# Patient Record
Sex: FEMALE | Race: WHITE | NOT HISPANIC OR LATINO | Employment: OTHER | URBAN - METROPOLITAN AREA
[De-identification: names, ages, dates, MRNs, and addresses within clinical notes are randomized per-mention and may not be internally consistent; named-entity substitution may affect disease eponyms.]

---

## 2021-05-25 ENCOUNTER — RECORDS - HEALTHEAST (OUTPATIENT)
Dept: ADMINISTRATIVE | Facility: CLINIC | Age: 81
End: 2021-05-25

## 2021-05-26 ENCOUNTER — RECORDS - HEALTHEAST (OUTPATIENT)
Dept: ADMINISTRATIVE | Facility: CLINIC | Age: 81
End: 2021-05-26

## 2021-05-27 ENCOUNTER — RECORDS - HEALTHEAST (OUTPATIENT)
Dept: ADMINISTRATIVE | Facility: CLINIC | Age: 81
End: 2021-05-27

## 2021-05-30 ENCOUNTER — RECORDS - HEALTHEAST (OUTPATIENT)
Dept: ADMINISTRATIVE | Facility: CLINIC | Age: 81
End: 2021-05-30

## 2022-04-27 ENCOUNTER — HOSPITAL ENCOUNTER (OUTPATIENT)
Facility: HOSPITAL | Age: 82
Setting detail: OBSERVATION
Discharge: HOME | End: 2022-04-29
Attending: EMERGENCY MEDICINE | Admitting: HOSPITALIST
Payer: COMMERCIAL

## 2022-04-27 DIAGNOSIS — E87.5 HYPERKALEMIA: ICD-10-CM

## 2022-04-27 DIAGNOSIS — R42 VERTIGO: ICD-10-CM

## 2022-04-27 PROCEDURE — 99285 EMERGENCY DEPT VISIT HI MDM: CPT | Mod: 25

## 2022-04-27 PROCEDURE — C9803 HOPD COVID-19 SPEC COLLECT: HCPCS

## 2022-04-28 ENCOUNTER — APPOINTMENT (OUTPATIENT)
Dept: MRI IMAGING | Facility: HOSPITAL | Age: 82
End: 2022-04-28
Attending: EMERGENCY MEDICINE
Payer: COMMERCIAL

## 2022-04-28 ENCOUNTER — APPOINTMENT (OUTPATIENT)
Dept: MRI IMAGING | Facility: HOSPITAL | Age: 82
End: 2022-04-28
Attending: HOSPITALIST
Payer: COMMERCIAL

## 2022-04-28 ENCOUNTER — APPOINTMENT (OUTPATIENT)
Dept: CARDIOLOGY | Facility: HOSPITAL | Age: 82
End: 2022-04-28
Attending: HOSPITALIST
Payer: COMMERCIAL

## 2022-04-28 PROBLEM — R42 VERTIGO: Status: ACTIVE | Noted: 2022-04-28

## 2022-04-28 PROBLEM — E87.5 HYPERKALEMIA: Status: ACTIVE | Noted: 2022-04-28

## 2022-04-28 LAB
ANION GAP SERPL CALCULATED.3IONS-SCNC: 13 MMOL/L (ref 5–18)
ANION GAP SERPL CALCULATED.3IONS-SCNC: 9 MMOL/L (ref 5–18)
ATRIAL RATE - MUSE: 56 BPM
BUN SERPL-MCNC: 27 MG/DL (ref 8–28)
BUN SERPL-MCNC: 37 MG/DL (ref 8–28)
CALCIUM SERPL-MCNC: 8.9 MG/DL (ref 8.5–10.5)
CALCIUM SERPL-MCNC: 9.6 MG/DL (ref 8.5–10.5)
CHLORIDE BLD-SCNC: 102 MMOL/L (ref 98–107)
CHLORIDE BLD-SCNC: 106 MMOL/L (ref 98–107)
CO2 SERPL-SCNC: 22 MMOL/L (ref 22–31)
CO2 SERPL-SCNC: 25 MMOL/L (ref 22–31)
CREAT SERPL-MCNC: 1 MG/DL (ref 0.6–1.1)
CREAT SERPL-MCNC: 1.32 MG/DL (ref 0.6–1.1)
DIASTOLIC BLOOD PRESSURE - MUSE: NORMAL MMHG
ERYTHROCYTE [DISTWIDTH] IN BLOOD BY AUTOMATED COUNT: 13.3 % (ref 10–15)
GFR SERPL CREATININE-BSD FRML MDRD: 40 ML/MIN/1.73M2
GFR SERPL CREATININE-BSD FRML MDRD: 55 ML/MIN/1.73M2
GLUCOSE BLD-MCNC: 117 MG/DL (ref 70–125)
GLUCOSE BLD-MCNC: 119 MG/DL (ref 70–125)
HCT VFR BLD AUTO: 39.8 % (ref 35–47)
HGB BLD-MCNC: 12.9 G/DL (ref 11.7–15.7)
INTERPRETATION ECG - MUSE: NORMAL
LVEF ECHO: NORMAL
MAGNESIUM SERPL-MCNC: 2.3 MG/DL (ref 1.8–2.6)
MCH RBC QN AUTO: 30.7 PG (ref 26.5–33)
MCHC RBC AUTO-ENTMCNC: 32.4 G/DL (ref 31.5–36.5)
MCV RBC AUTO: 95 FL (ref 78–100)
P AXIS - MUSE: 66 DEGREES
PLATELET # BLD AUTO: 257 10E3/UL (ref 150–450)
POTASSIUM BLD-SCNC: 4.3 MMOL/L (ref 3.5–5)
POTASSIUM BLD-SCNC: 5.1 MMOL/L (ref 3.5–5)
PR INTERVAL - MUSE: 218 MS
QRS DURATION - MUSE: 86 MS
QT - MUSE: 442 MS
QTC - MUSE: 426 MS
R AXIS - MUSE: 9 DEGREES
RBC # BLD AUTO: 4.2 10E6/UL (ref 3.8–5.2)
SARS-COV-2 RNA RESP QL NAA+PROBE: NEGATIVE
SODIUM SERPL-SCNC: 137 MMOL/L (ref 136–145)
SODIUM SERPL-SCNC: 140 MMOL/L (ref 136–145)
SYSTOLIC BLOOD PRESSURE - MUSE: NORMAL MMHG
T AXIS - MUSE: 75 DEGREES
VENTRICULAR RATE- MUSE: 56 BPM
WBC # BLD AUTO: 7.5 10E3/UL (ref 4–11)

## 2022-04-28 PROCEDURE — 250N000013 HC RX MED GY IP 250 OP 250 PS 637: Performed by: HOSPITALIST

## 2022-04-28 PROCEDURE — 255N000002 HC RX 255 OP 636: Performed by: EMERGENCY MEDICINE

## 2022-04-28 PROCEDURE — 96372 THER/PROPH/DIAG INJ SC/IM: CPT | Mod: XS | Performed by: HOSPITALIST

## 2022-04-28 PROCEDURE — G0378 HOSPITAL OBSERVATION PER HR: HCPCS

## 2022-04-28 PROCEDURE — 99220 PR INITIAL OBSERVATION CARE,LEVEL III: CPT | Performed by: HOSPITALIST

## 2022-04-28 PROCEDURE — 83735 ASSAY OF MAGNESIUM: CPT | Performed by: EMERGENCY MEDICINE

## 2022-04-28 PROCEDURE — 93306 TTE W/DOPPLER COMPLETE: CPT

## 2022-04-28 PROCEDURE — 36415 COLL VENOUS BLD VENIPUNCTURE: CPT | Performed by: EMERGENCY MEDICINE

## 2022-04-28 PROCEDURE — 258N000003 HC RX IP 258 OP 636: Performed by: HOSPITALIST

## 2022-04-28 PROCEDURE — 250N000013 HC RX MED GY IP 250 OP 250 PS 637: Performed by: EMERGENCY MEDICINE

## 2022-04-28 PROCEDURE — 36415 COLL VENOUS BLD VENIPUNCTURE: CPT | Performed by: HOSPITALIST

## 2022-04-28 PROCEDURE — 85027 COMPLETE CBC AUTOMATED: CPT | Performed by: EMERGENCY MEDICINE

## 2022-04-28 PROCEDURE — 999N000111 HC STATISTIC OT IP EVAL DEFER

## 2022-04-28 PROCEDURE — 80048 BASIC METABOLIC PNL TOTAL CA: CPT | Performed by: HOSPITALIST

## 2022-04-28 PROCEDURE — 96372 THER/PROPH/DIAG INJ SC/IM: CPT | Performed by: HOSPITALIST

## 2022-04-28 PROCEDURE — 70546 MR ANGIOGRAPH HEAD W/O&W/DYE: CPT

## 2022-04-28 PROCEDURE — 70551 MRI BRAIN STEM W/O DYE: CPT

## 2022-04-28 PROCEDURE — 80048 BASIC METABOLIC PNL TOTAL CA: CPT | Performed by: EMERGENCY MEDICINE

## 2022-04-28 PROCEDURE — 93306 TTE W/DOPPLER COMPLETE: CPT | Mod: 26 | Performed by: INTERNAL MEDICINE

## 2022-04-28 PROCEDURE — 87635 SARS-COV-2 COVID-19 AMP PRB: CPT | Performed by: EMERGENCY MEDICINE

## 2022-04-28 PROCEDURE — 93005 ELECTROCARDIOGRAM TRACING: CPT | Performed by: EMERGENCY MEDICINE

## 2022-04-28 PROCEDURE — 250N000011 HC RX IP 250 OP 636: Performed by: HOSPITALIST

## 2022-04-28 PROCEDURE — A9585 GADOBUTROL INJECTION: HCPCS | Performed by: EMERGENCY MEDICINE

## 2022-04-28 RX ORDER — LOSARTAN POTASSIUM 50 MG/1
100 TABLET ORAL DAILY
Status: DISCONTINUED | OUTPATIENT
Start: 2022-04-28 | End: 2022-04-29 | Stop reason: HOSPADM

## 2022-04-28 RX ORDER — ACETAMINOPHEN 325 MG/1
650 TABLET ORAL EVERY 4 HOURS PRN
Status: DISCONTINUED | OUTPATIENT
Start: 2022-04-28 | End: 2022-04-29 | Stop reason: HOSPADM

## 2022-04-28 RX ORDER — HYDRALAZINE HYDROCHLORIDE 10 MG/1
10 TABLET, FILM COATED ORAL 3 TIMES DAILY
Status: DISCONTINUED | OUTPATIENT
Start: 2022-04-28 | End: 2022-04-29 | Stop reason: HOSPADM

## 2022-04-28 RX ORDER — ONDANSETRON 2 MG/ML
4 INJECTION INTRAMUSCULAR; INTRAVENOUS EVERY 6 HOURS PRN
Status: DISCONTINUED | OUTPATIENT
Start: 2022-04-28 | End: 2022-04-29 | Stop reason: HOSPADM

## 2022-04-28 RX ORDER — LOSARTAN POTASSIUM 100 MG/1
100 TABLET ORAL DAILY
COMMUNITY
Start: 2022-01-27

## 2022-04-28 RX ORDER — ROSUVASTATIN CALCIUM 10 MG/1
10 TABLET, COATED ORAL AT BEDTIME
Status: DISCONTINUED | OUTPATIENT
Start: 2022-04-28 | End: 2022-04-29 | Stop reason: HOSPADM

## 2022-04-28 RX ORDER — SPIRONOLACTONE 25 MG/1
25 TABLET ORAL EVERY MORNING
Status: DISCONTINUED | OUTPATIENT
Start: 2022-04-28 | End: 2022-04-29 | Stop reason: HOSPADM

## 2022-04-28 RX ORDER — ROPINIROLE 0.25 MG/1
0.5 TABLET, FILM COATED ORAL AT BEDTIME
Status: DISCONTINUED | OUTPATIENT
Start: 2022-04-28 | End: 2022-04-29 | Stop reason: HOSPADM

## 2022-04-28 RX ORDER — ACETAMINOPHEN 325 MG/1
650 TABLET ORAL EVERY 8 HOURS
Status: DISCONTINUED | OUTPATIENT
Start: 2022-04-28 | End: 2022-04-29 | Stop reason: HOSPADM

## 2022-04-28 RX ORDER — ACYCLOVIR 400 MG/1
400 TABLET ORAL 3 TIMES DAILY PRN
COMMUNITY
Start: 2022-01-04

## 2022-04-28 RX ORDER — SERTRALINE HYDROCHLORIDE 100 MG/1
100 TABLET, FILM COATED ORAL DAILY
Status: DISCONTINUED | OUTPATIENT
Start: 2022-04-28 | End: 2022-04-29 | Stop reason: HOSPADM

## 2022-04-28 RX ORDER — ONDANSETRON 4 MG/1
4 TABLET, ORALLY DISINTEGRATING ORAL EVERY 6 HOURS PRN
Status: DISCONTINUED | OUTPATIENT
Start: 2022-04-28 | End: 2022-04-29 | Stop reason: HOSPADM

## 2022-04-28 RX ORDER — HEPARIN SODIUM 5000 [USP'U]/.5ML
5000 INJECTION, SOLUTION INTRAVENOUS; SUBCUTANEOUS EVERY 12 HOURS
Status: DISCONTINUED | OUTPATIENT
Start: 2022-04-28 | End: 2022-04-29 | Stop reason: HOSPADM

## 2022-04-28 RX ORDER — MECLIZINE HYDROCHLORIDE 25 MG/1
25 TABLET ORAL 3 TIMES DAILY PRN
Status: DISCONTINUED | OUTPATIENT
Start: 2022-04-28 | End: 2022-04-29 | Stop reason: HOSPADM

## 2022-04-28 RX ORDER — MECLIZINE HCL 12.5 MG 12.5 MG/1
25 TABLET ORAL ONCE
Status: COMPLETED | OUTPATIENT
Start: 2022-04-28 | End: 2022-04-28

## 2022-04-28 RX ORDER — AMOXICILLIN 250 MG
2 CAPSULE ORAL 2 TIMES DAILY PRN
Status: DISCONTINUED | OUTPATIENT
Start: 2022-04-28 | End: 2022-04-29 | Stop reason: HOSPADM

## 2022-04-28 RX ORDER — ROPINIROLE 0.5 MG/1
0.5 TABLET, FILM COATED ORAL
COMMUNITY
Start: 2021-08-23

## 2022-04-28 RX ORDER — ROSUVASTATIN CALCIUM 10 MG/1
10 TABLET, COATED ORAL AT BEDTIME
COMMUNITY
Start: 2022-04-23

## 2022-04-28 RX ORDER — FUROSEMIDE 40 MG
40 TABLET ORAL EVERY MORNING
COMMUNITY
Start: 2022-04-02

## 2022-04-28 RX ORDER — AMOXICILLIN 250 MG
1 CAPSULE ORAL 2 TIMES DAILY PRN
Status: DISCONTINUED | OUTPATIENT
Start: 2022-04-28 | End: 2022-04-29 | Stop reason: HOSPADM

## 2022-04-28 RX ORDER — ROPINIROLE 0.25 MG/1
0.5 TABLET, FILM COATED ORAL
Status: DISCONTINUED | OUTPATIENT
Start: 2022-04-28 | End: 2022-04-29 | Stop reason: HOSPADM

## 2022-04-28 RX ORDER — SERTRALINE HYDROCHLORIDE 100 MG/1
100 TABLET, FILM COATED ORAL DAILY
COMMUNITY
Start: 2022-04-07

## 2022-04-28 RX ORDER — HYDRALAZINE HYDROCHLORIDE 10 MG/1
10 TABLET, FILM COATED ORAL 3 TIMES DAILY
COMMUNITY
Start: 2022-02-17

## 2022-04-28 RX ORDER — ROPINIROLE 0.5 MG/1
0.5 TABLET, FILM COATED ORAL AT BEDTIME
COMMUNITY
Start: 2022-04-17

## 2022-04-28 RX ORDER — SODIUM CHLORIDE 9 MG/ML
INJECTION, SOLUTION INTRAVENOUS CONTINUOUS
Status: ACTIVE | OUTPATIENT
Start: 2022-04-28 | End: 2022-04-28

## 2022-04-28 RX ORDER — FUROSEMIDE 40 MG
20 TABLET ORAL DAILY
COMMUNITY
Start: 2021-08-23

## 2022-04-28 RX ORDER — SPIRONOLACTONE 25 MG/1
25 TABLET ORAL EVERY MORNING
COMMUNITY
Start: 2022-04-07

## 2022-04-28 RX ORDER — GADOBUTROL 604.72 MG/ML
6 INJECTION INTRAVENOUS ONCE
Status: COMPLETED | OUTPATIENT
Start: 2022-04-28 | End: 2022-04-28

## 2022-04-28 RX ADMIN — HYDRALAZINE HYDROCHLORIDE 10 MG: 10 TABLET, FILM COATED ORAL at 21:20

## 2022-04-28 RX ADMIN — MECLIZINE 25 MG: 12.5 TABLET ORAL at 01:16

## 2022-04-28 RX ADMIN — SERTRALINE HYDROCHLORIDE 100 MG: 100 TABLET, FILM COATED ORAL at 08:40

## 2022-04-28 RX ADMIN — HYDRALAZINE HYDROCHLORIDE 10 MG: 10 TABLET, FILM COATED ORAL at 08:40

## 2022-04-28 RX ADMIN — HEPARIN SODIUM 5000 UNITS: 10000 INJECTION, SOLUTION INTRAVENOUS; SUBCUTANEOUS at 22:28

## 2022-04-28 RX ADMIN — ROPINIROLE HYDROCHLORIDE 0.5 MG: 0.25 TABLET, FILM COATED ORAL at 21:21

## 2022-04-28 RX ADMIN — HYDRALAZINE HYDROCHLORIDE 10 MG: 10 TABLET, FILM COATED ORAL at 15:04

## 2022-04-28 RX ADMIN — GADOBUTROL 6 ML: 604.72 INJECTION INTRAVENOUS at 06:07

## 2022-04-28 RX ADMIN — SPIRONOLACTONE 25 MG: 25 TABLET, FILM COATED ORAL at 08:40

## 2022-04-28 RX ADMIN — SODIUM CHLORIDE: 9 INJECTION, SOLUTION INTRAVENOUS at 06:44

## 2022-04-28 RX ADMIN — HEPARIN SODIUM 5000 UNITS: 10000 INJECTION, SOLUTION INTRAVENOUS; SUBCUTANEOUS at 11:34

## 2022-04-28 RX ADMIN — ROSUVASTATIN CALCIUM 10 MG: 10 TABLET, FILM COATED ORAL at 21:21

## 2022-04-28 RX ADMIN — LOSARTAN POTASSIUM 100 MG: 50 TABLET, FILM COATED ORAL at 08:40

## 2022-04-28 ASSESSMENT — ENCOUNTER SYMPTOMS
NAUSEA: 0
CHILLS: 0
LIGHT-HEADEDNESS: 1
DIARRHEA: 0
DIZZINESS: 1
PALPITATIONS: 0
FEVER: 0
COUGH: 0
VOMITING: 0
NUMBNESS: 0
WEAKNESS: 0

## 2022-04-28 NOTE — ED TRIAGE NOTES
Pt presents with intermittent vertigo for 2 days.  Today vertigo became worse with movement.  No nausea or vomiting.  Pt is unable to walk by self tonight due to vertigo.      Triage Assessment     Row Name 04/27/22 2130       Triage Assessment (Adult)    Airway WDL WDL       Respiratory WDL    Respiratory WDL WDL       Skin Circulation/Temperature WDL    Skin Circulation/Temperature WDL WDL       Cardiac WDL    Cardiac WDL WDL       Peripheral/Neurovascular WDL    Peripheral Neurovascular WDL WDL       Cognitive/Neuro/Behavioral WDL    Cognitive/Neuro/Behavioral WDL WDL

## 2022-04-28 NOTE — PLAN OF CARE
Problem: Plan of Care - These are the overarching goals to be used throughout the patient stay.    Goal: Optimal Comfort and Wellbeing  Outcome: Ongoing, Progressing   Goal Outcome Evaluation:  Patient is alert and oriented X4. Patient is up with stand by assist, denies any dizziness. IV fluids running. Patient ate 100% of lunch and tolerated well.

## 2022-04-28 NOTE — PHARMACY-ADMISSION MEDICATION HISTORY
Pharmacy Note - Admission Medication History    Pertinent Provider Information: N/A     ______________________________________________________________________    Prior To Admission (PTA) med list completed and updated in EMR.       PTA Med List   Medication Sig Last Dose     acyclovir (ZOVIRAX) 400 MG tablet Take 400 mg by mouth 3 times daily as needed (Herpetic humberto) For 10 days. Unknown at not currently taking     furosemide (LASIX) 40 MG tablet Take 40 mg by mouth every morning 4/27/2022 at AM     furosemide (LASIX) 40 MG tablet Take 20 mg by mouth daily Afternoon, half-tablet 4/27/2022 at 1400     hydrALAZINE (APRESOLINE) 10 MG tablet Take 10 mg by mouth 3 times daily 4/27/2022 at 1400 x2     losartan (COZAAR) 100 MG tablet Take 100 mg by mouth daily 4/27/2022 at AM     rOPINIRole (REQUIP) 0.5 MG tablet Take 0.5 mg by mouth At Bedtime May repeat x1. 4/26/2022 at PM     rOPINIRole (REQUIP) 0.5 MG tablet Take 0.5 mg by mouth nightly as needed Unknown at Unknown time     rosuvastatin (CRESTOR) 10 MG tablet Take 10 mg by mouth At Bedtime 4/26/2022 at PM     sertraline (ZOLOFT) 100 MG tablet Take 100 mg by mouth daily 4/27/2022 at AM     spironolactone (ALDACTONE) 25 MG tablet Take 25 mg by mouth every morning 4/27/2022 at AM       Information source(s): Patient and CareEverywhere/[facility]  Method of interview communication: in-person    Summary of Changes to PTA Med List  New: all listed  Discontinued: N/A  Changed: N/A    Patient was asked about OTC/herbal products specifically.  PTA med list reflects this.    In the past week, patient estimated taking medication this percent of the time:  greater than 90%.    Allergies were reviewed, assessed, and updated with the patient.      Patient does not use any multi-dose medications prior to admission.    The information provided in this note is only as accurate as the sources available at the time of the update(s).    Thank you for the opportunity to participate in  the care of this patient.    Edgardo Casey [facility]  4/28/2022 7:29 AM

## 2022-04-28 NOTE — PLAN OF CARE
PRIMARY DIAGNOSIS: GENERALIZED WEAKNESS    OUTPATIENT/OBSERVATION GOALS TO BE MET BEFORE DISCHARGE  1. Orthostatic performed: Yes, no drop noted in blood pressure                      2. Tolerating PO medications: Yes    3. Return to near baseline physical activity: Yes, ambulating well, steady on feet. Denies any dizziness.     4. Cleared for discharge by consultants (if involved): No    Discharge Planner Nurse   Safe discharge environment identified: Yes  Barriers to discharge:Yes, PT/OT to see, echocardiogram completed       Entered by: Kirsten Stanley RN 04/28/2022 2:52 PM     Please review provider order for any additional goals.   Nurse to notify provider when observation goals have been met and patient is ready for discharge.Goal Outcome Evaluation:

## 2022-04-28 NOTE — PLAN OF CARE
PRIMARY DIAGNOSIS: ACUTE PAIN  OUTPATIENT/OBSERVATION GOALS TO BE MET BEFORE DISCHARGE:  1. Pain Status: Pain free.    2. Return to near baseline physical activity: No    3. Cleared for discharge by consultants (if involved): No    Discharge Planner Nurse   Safe discharge environment identified: No  Barriers to discharge: Yes       Entered by: Marie Epstein RN 04/28/2022 6:25 PM     Please review provider order for any additional goals.   Nurse to notify provider when observation goals have been met and patient is ready for discharge.Goal Outcome Evaluation:

## 2022-04-28 NOTE — PLAN OF CARE
OT orders received and chart review complete. Goals of care discussed with patient and her daughter and no IP OT needs were identified. Pt would like to participate in IP PT and IP OT if her vertigo turns out to be related to vestibular issues. Will complete orders.

## 2022-04-28 NOTE — ED PROVIDER NOTES
EMERGENCY DEPARTMENT ENCOUNTER      NAME: Meena Kyle  YOB: 1940  MRN: [id number]      FINAL IMPRESSION  1. Vertigo    2. Hyperkalemia        MEDICAL DECISION MAKING   Pertinent Labs & Imaging studies reviewed. (See chart for details)    Meena Kyle is a 82 year old female who presents for evaluation of dizziness. Patient reports ~1 week of intermittent episodes of dizziness. Earlier this evening, she woke up from a nap with symptoms so severe that she didn't feel she could stand up or make it downstairs, much worse than previous episodes. She has no other new complaints. She does not have a history of vertigo.  Vitals on arrival stable. Remainder of history and exam, as below.     I considered a broad differential diagnosis including, but not limited to BPPV, vestibular neuritis, labyrinthitis, migraine, arrhythmia, orthostatic hypotension, CVA/TIA, carotid or vertebral artery dissection, vertebro-basilar insufficiency, mass/tumor, electrolyte derangement, anemia, occult infection. Meniere's and labyrinthitis less likely without hearing loss/tinnitus. Although I suspect symptoms are peripheral in nature, she has had some recurrent and slightly different episodes in the past week and cannot definitively rule out CVA/TIA on exam. Discussed options for workup and management with patient and her daughter. We have agreed on plan for MR/MRA head/neck, labs, ECG. Will try meclizine for symptoms.      ED Course as of 04/28/22 0524   Thu Apr 28, 2022   0257 Basic metabolic panel(!)  BMP notable for elevated potassium at 5.1 and creatinine at 1.32.  No recent for comparison in our system but creatinine does appear consistent with baseline per care everywhere.  No anion gap or acidosis.  EKG without acute changes.     0257 CBC (+ platelets, no diff)  CBC reassuring. No evidence of leukocytosis to suggest systemic infectious/inflammatory process. No acute anemia. PLTs wnl.   0257 Magnesium:  2.3  Wnl, reassuring.   0311 MR Brain w/o Contrast  MR brain revealed loss of normal flow void in left transverse and sigmoid sinuses, possibly related to slow flow or less likely, venous sinus thrombosis.       I rechecked the patient multiple times.  She remained comfortable without recurrence of dizziness while lying still in the bed.  I reviewed results of labs and imaging with she and her daughter.  We had a relatively long discussion about options for further work-up and management.  Ultimately, given abnormal MRI, elevated potassium, and severity of symptoms, agreed on plan for admission to observation.    Discussed the case with Dr. Wilson of hospital medicine team who agreed to facilitate admission to observation and will obtain MRV this morniing. COVID was ordered for screening/hospitalization purposes.  As we do not currently have any beds available here, she will likely need to sleep in the ED for the rest of the evening but hospitalist has now taken over management.  No acute events under my care.         ED COURSE  12:11 AM I met with the patient for the initial interview and physical examination. Discussed plan for treatment and workup in the ED.    3:16 AM I rechecked and updated the patient.   3:56 AM I discussed the case with hospitalist, Dr. Wilson.     MEDICATIONS GIVEN IN THE ED  Medications   meclizine (ANTIVERT) tablet 25 mg (25 mg Oral Given 4/28/22 0116)       NEW PRESCRIPTIONS STARTED AT TODAY'S VISIT  New Prescriptions    No medications on file          =================================================================    Chief Complaint   Patient presents with     Dizziness         HPI:    Patient information was obtained from: Patient     Use of : N/A     Meena Kyle is a 82 year old female with a pertinent medical history of hypertension, restless legs syndrome, and DM II who presents to this ED by private vehicle for evaluation of dizziness.    Patient states that  "over the past week or so she has had occasional episodes of mild dizziness. Today after getting out of bed after a nap, she had sudden onset of more severe dizziness. She describes her dizziness as more of a lightheadedness that is provoked by position changes such as sitting or standing up. She states that she was barely able to walk down the stairs due to her dizziness, but when she got downstairs she sat on the couch and called her neighbor. She does not have any dizziness currently while laying at rest. She denies any falls. No weakness, numbness, chest pain, palpitations, tinnitus, or vision changes. Patient otherwise denies fever, chills, cough, nausea, vomiting, diarrhea, or additional symptoms or complaints at this time.         RELEVANT HISTORY, MEDICATIONS, & ALLERGIES   Past medical history, surgical history, family history, medications, and allergies reviewed and pertinent noted in HPI. See end of note for comprehensive list.    REVIEW OF SYSTEMS:  Review of Systems   Constitutional: Negative for chills and fever.   HENT: Negative for tinnitus.    Eyes: Negative for visual disturbance.   Respiratory: Negative for cough.    Cardiovascular: Negative for chest pain and palpitations.   Gastrointestinal: Negative for diarrhea, nausea and vomiting.   Neurological: Positive for dizziness and light-headedness. Negative for weakness and numbness.   All other systems reviewed and are negative.      PHYSICAL EXAM:    Vitals: BP (!) 176/79   Pulse 57   Temp 98.5  F (36.9  C) (Temporal)   Resp 15   Ht 1.575 m (5' 2\")   Wt 61.2 kg (135 lb)   SpO2 99%   BMI 24.69 kg/m     General: Alert and interactive, comfortable appearing.  HENT: Oropharynx without erythema or exudates. MMM.  Hearing aid in place on right.  Eyes: Pupils mid-sized and equally reactive.  EOMs intact.  No nystagmus.  Neck: Full AROM.   Cardiovascular: Regular rate and rhythm. Peripheral pulses 2+ bilaterally.  Chest/Pulmonary: Normal work of " breathing. Lung sounds clear and equal throughout, no wheezes or crackles. No chest wall tenderness or deformities.  Abdomen: Soft, nondistended. Nontender without guarding or rebound.  Back/Spine: No CVA or midline tenderness.  Extremities: Normal ROM of all major joints. No lower extremity edema.   Skin: Warm and dry. Normal skin color.   Neuro: Speech clear. CNs grossly intact. Moves all extremities appropriately. Strength and sensation grossly intact to all extremities.  Normal finger-nose-finger and rapid alternating hand movements bilaterally.  No ataxia.  Reports provokable dizziness on sitting upright.  Psych: Normal affect/mood, cooperative, memory appropriate.     LAB  Labs Ordered and Resulted from Time of ED Arrival to Time of ED Departure   BASIC METABOLIC PANEL - Abnormal       Result Value    Sodium 140      Potassium 5.1 (*)     Chloride 102      Carbon Dioxide (CO2) 25      Anion Gap 13      Urea Nitrogen 37 (*)     Creatinine 1.32 (*)     Calcium 9.6      Glucose 117      GFR Estimate 40 (*)    MAGNESIUM - Normal    Magnesium 2.3     CBC WITH PLATELETS - Normal    WBC Count 7.5      RBC Count 4.20      Hemoglobin 12.9      Hematocrit 39.8      MCV 95      MCH 30.7      MCHC 32.4      RDW 13.3      Platelet Count 257     COVID-19 VIRUS (CORONAVIRUS) BY PCR       RADIOLOGY  MR Brain w/o Contrast   Final Result   IMPRESSION:   1.  Loss of the normal flow void in the left transverse and sigmoid sinuses may be related to slow flow although venous sinus thrombosis could give this appearance. If indicated, consider MRV or CTV of the head.   2.  Age-related changes.          EKG  Performed at: 0057  Impression: Sinus bradycardia with first-degree AV block.  No acute ischemic changes.  No previous for comparison.  Rate: 56 bpm  Rhythm: sinus  QRS Interval: 86 ms  QTc Interval: 426 ms  Comparison: No previous EKGs available    All laboratory and imaging results and EKG's were personally reviewed and  "interpreted by myself prior to disposition decision.         Comprehensive outline of EPIC chart Hx  PAST MEDICAL HISTORY    History reviewed. No pertinent past medical history.  History reviewed. No pertinent surgical history.    CURRENT MEDICATIONS    No current outpatient medications    ALLERGIES    Allergies   Allergen Reactions     Amlodipine Swelling     Atorvastatin Muscle Pain (Myalgia)     Celecoxib Itching     Got itchy      Lisinopril Cough     Lovastatin Muscle Pain (Myalgia)     Sulfa Drugs Hives     \"facial & lip swelling\"     Zolpidem Other (See Comments)     Sleep walking.       FAMILY HISTORY    History reviewed. No pertinent family history.    SOCIAL HISTORY    Lives alone  Denies drug or alcohol use  Has 1 pet dog      I, Shira Garrison, am serving as a scribe to document services personally performed by Dr. Leticia Bess based on my observation and the provider's statements to me. I, Leticia Bess MD attest that Shira Garrison is acting in a scribe capacity, has observed my performance of the services and has documented them in accordance with my direction.    Leticia Bess M.D.  Emergency Medicine  [facility] EMERGENCY DEPARTMENT  [address] [place] [id number] [phone]  Dept: [phone]     Leticia Bess MD  04/28/22 0526       Leticia Bess MD  04/28/22 0529    "

## 2022-04-28 NOTE — H&P
[facility]    History and Physical - Hospitalist Service       Date of Admission:  4/27/2022    Assessment & Plan      Meena Kyle is a 82 year old female admitted on 4/27/2022. She has h/o hypertension, mood disorder, hyperlipidemia, restless leg presented today with diziness    Dizziness - unclear etiology  -Onset 1 MRI of the brain showed loss of normal flow in the left transverse and sigmoid sinus may be related to slow flow on the venous sinus thrombosis could give this appearance.  -MRV showed no dural venous sinus thrombosis or significant stenosis.  -Check static blood pressure  -Placed on fall precautions  -Monitor on telemetry, echo ordered  -Started on meclizine, continue as patient reports this seems to be helping slightly.       Hypertension-currently elevated  -Resume PTA hydralazine,.  -Holding losartan, spironolactone, Lasix due to DANE/hyperK..    Hyperlipidemia-continue PTA statin    Restless leg syndrome-PTA Requip    Mood disorder-stable, resume PTA sertraline    Hyperkalemia - recheck BMP now    DANE on CKD stage 3?  -Baseline creat unknown. Encourage PO hydration, recheck BMP        Diet: Combination Diet No Caffeine Diet    DVT Prophylaxis: Heparin SQ  Randall Catheter: Not present  Central Lines: None  Code Status: Full Code      Disposition Plan   Expected Discharge: Tomorrow  Anticipated discharge location:  Awaiting care coordination huddle  Delays: ending work up       The patient's care was discussed with the Patient and Patient's Family.    Princess Prado MD  [facility]  ______________________________________________________________________    Chief Complaint   Dizziness    History is obtained from the patient    History of Present Illness   Meena Kyle is a 82 year old female with history of hypertension, restless leg syndrome, mood disorder, hyperlipidemia presented today with chief complaint of dizziness.  Patient  reports feeling dizzy for the past 1 week, more so with ambulation.  It was worse yesterday and hence she came into ER for further evaluation.  Patient currently reports her dizziness is much improved and denies any during my encounter..Denies any chest pain, shortness of breath, abdominal pain, nausea, vomiting, cough, diarrhea, constipation, dysuria or polyuria.  No palpitations.  No tingling or numbness.  No recent travel.  No known exposures to Covid.  No sick contact.  Reports has history of high blood pressure usually systolic runs sometimes runs in 200's  but does not really regularly check at home.    In ER, on arrival noted elevated BP.  Labs remarkable for mild hyperkalemia and creatinine slightly up.MRI and MRV reviewed.       Review of Systems    The 10 point Review of Systems is negative other than noted in the HPI or here.     Past Medical History    Hypertension, restless leg, mood disorder    Past Surgical History   Reviewed and non contributory  Social History   I have reviewed this patient's social history and updated it with pertinent information if needed.       Family History   Reviewed and non contributory.    Prior to Admission Medications   Prior to Admission Medications   Prescriptions Last Dose Informant Patient Reported? Taking?   acyclovir (ZOVIRAX) 400 MG tablet Unknown at not currently taking  Yes Yes   Sig: Take 400 mg by mouth 3 times daily as needed (Herpetic humberto) For 10 days.   furosemide (LASIX) 40 MG tablet 4/27/2022 at AM  Yes Yes   Sig: Take 40 mg by mouth every morning   furosemide (LASIX) 40 MG tablet 4/27/2022 at 1400  Yes Yes   Sig: Take 20 mg by mouth daily Afternoon, half-tablet   hydrALAZINE (APRESOLINE) 10 MG tablet 4/27/2022 at 1400 x2  Yes Yes   Sig: Take 10 mg by mouth 3 times daily   losartan (COZAAR) 100 MG tablet 4/27/2022 at AM  Yes Yes   Sig: Take 100 mg by mouth daily   rOPINIRole (REQUIP) 0.5 MG tablet 4/26/2022 at PM  Yes Yes   Sig: Take 0.5 mg by mouth At  "Bedtime May repeat x1.   rOPINIRole (REQUIP) 0.5 MG tablet Unknown at Unknown time  Yes Yes   Sig: Take 0.5 mg by mouth nightly as needed   rosuvastatin (CRESTOR) 10 MG tablet 4/26/2022 at PM  Yes Yes   Sig: Take 10 mg by mouth At Bedtime   sertraline (ZOLOFT) 100 MG tablet 4/27/2022 at AM  Yes Yes   Sig: Take 100 mg by mouth daily   spironolactone (ALDACTONE) 25 MG tablet 4/27/2022 at AM  Yes Yes   Sig: Take 25 mg by mouth every morning      Facility-Administered Medications: None     Allergies   Allergies   Allergen Reactions     Amlodipine Swelling     Atorvastatin Muscle Pain (Myalgia)     Celecoxib Itching     Got itchy      Lisinopril Cough     Lovastatin Muscle Pain (Myalgia)     Sulfa Drugs Hives     \"facial & lip swelling\"     Zolpidem Other (See Comments)     Sleep walking.       Physical Exam   Vital Signs: Temp: 98.5  F (36.9  C) Temp src: Temporal BP: (!) 166/79 Pulse: 65   Resp: 17 SpO2: 100 % O2 Device: None (Room air)    Weight: 135 lbs 0 oz  General:  Appears stated age, no acute distress. A&O x 3.  Skin:  Warm, dry. No rashes or lesions on exposed skin.  HEENT:  Normocephalic, atraumatic; EOMs grossly intact.  Neck:  Supple.  Chest:  Breath sounds CTA and no increased work of breathing on room air.  Cardiovascular:  RRR, no rub or murmur. No peripheral edema.  Abdomen:  Soft, non-tender, non-distended.  Musculoskeletal:  Moves all four extremities. No muscle atrophy.  Neurological:  CN 2-12 grossly intact.    Data   Data reviewed today: I reviewed all medications, new labs and imaging results over the last 24 hours.  I personally reviewed labs and MRI/MRV     Recent Labs   Lab 04/28/22  0149   WBC 7.5   HGB 12.9   MCV 95         POTASSIUM 5.1*   CHLORIDE 102   CO2 25   BUN 37*   CR 1.32*   ANIONGAP 13   MIREYA 9.6          Imaging:  Recent Results (from the past 24 hour(s))   MR Brain w/o Contrast    Narrative    EXAM: MR BRAIN W/O CONTRAST  LOCATION: [facility]" [facility]  DATE/TIME: 4/28/2022 2:16 AM    INDICATION: Dizziness, non-specific  COMPARISON: None.  TECHNIQUE: Routine multiplanar multisequence head MRI without intravenous contrast.    FINDINGS:  INTRACRANIAL CONTENTS: No acute or subacute infarct. No mass, acute hemorrhage, or extra-axial fluid collections. Patchy nonspecific T2/FLAIR hyperintensities within the cerebral white matter and kadi most consistent with mild to moderate chronic   microvascular ischemic change. Mild to moderate generalized cerebral atrophy. No hydrocephalus. Normal position of the cerebellar tonsils.     SELLA: No abnormality accounting for technique.    OSSEOUS STRUCTURES/SOFT TISSUES: Normal marrow signal. Loss of the normal flow related signal in the left transverse and sigmoid sinuses.     ORBITS: No abnormality accounting for technique.     SINUSES/MASTOIDS: No paranasal sinus mucosal disease. No middle ear or mastoid effusion.       Impression    IMPRESSION:  1.  Loss of the normal flow void in the left transverse and sigmoid sinuses may be related to slow flow although venous sinus thrombosis could give this appearance. If indicated, consider MRV or CTV of the head.  2.  Age-related changes.   MRV Brain wo & w Contrast    Narrative    EXAM: MRV BRAIN  WO and W CONTRAST  LOCATION: [facility]  DATE/TIME: 4/28/2022 5:55 AM    INDICATION: Vertigo, peripheral  COMPARISON: MRI brain on the same date  CONTRAST: 6ml gadavist   TECHNIQUE:   Phase contrast and 2-D time-of-flight head MRV performed after administration of intravenous contrast.    FINDINGS:    HEAD MRV:   DURAL SINUSES: No significant stenosis or occlusion. Balanced transverse and sigmoid sinuses.    INTERNAL CEREBRAL VEINS: No significant stenosis or occlusion.      MAJOR CORTICAL VENOUS BRANCHES: No significant stenosis or occlusion.      Impression    IMPRESSION:  HEAD MRV:   1.  No dural venous sinus thrombosis or significant stenosis.

## 2022-04-29 ENCOUNTER — APPOINTMENT (OUTPATIENT)
Dept: PHYSICAL THERAPY | Facility: HOSPITAL | Age: 82
End: 2022-04-29
Attending: HOSPITALIST
Payer: COMMERCIAL

## 2022-04-29 VITALS
SYSTOLIC BLOOD PRESSURE: 130 MMHG | HEIGHT: 62 IN | HEART RATE: 70 BPM | WEIGHT: 135 LBS | TEMPERATURE: 98 F | BODY MASS INDEX: 24.84 KG/M2 | RESPIRATION RATE: 16 BRPM | DIASTOLIC BLOOD PRESSURE: 58 MMHG | OXYGEN SATURATION: 96 %

## 2022-04-29 LAB
ALBUMIN SERPL-MCNC: 3.2 G/DL (ref 3.5–5)
ALP SERPL-CCNC: 64 U/L (ref 45–120)
ALT SERPL W P-5'-P-CCNC: 10 U/L (ref 0–45)
ANION GAP SERPL CALCULATED.3IONS-SCNC: 8 MMOL/L (ref 5–18)
AST SERPL W P-5'-P-CCNC: 21 U/L (ref 0–40)
BASOPHILS # BLD AUTO: 0 10E3/UL (ref 0–0.2)
BASOPHILS NFR BLD AUTO: 1 %
BILIRUB SERPL-MCNC: 0.2 MG/DL (ref 0–1)
BUN SERPL-MCNC: 26 MG/DL (ref 8–28)
CALCIUM SERPL-MCNC: 8.8 MG/DL (ref 8.5–10.5)
CHLORIDE BLD-SCNC: 111 MMOL/L (ref 98–107)
CO2 SERPL-SCNC: 25 MMOL/L (ref 22–31)
CREAT SERPL-MCNC: 0.86 MG/DL (ref 0.6–1.1)
EOSINOPHIL # BLD AUTO: 0.2 10E3/UL (ref 0–0.7)
EOSINOPHIL NFR BLD AUTO: 5 %
ERYTHROCYTE [DISTWIDTH] IN BLOOD BY AUTOMATED COUNT: 13.3 % (ref 10–15)
GFR SERPL CREATININE-BSD FRML MDRD: 66 ML/MIN/1.73M2
GLUCOSE BLD-MCNC: 97 MG/DL (ref 70–125)
HCT VFR BLD AUTO: 37.5 % (ref 35–47)
HGB BLD-MCNC: 12.1 G/DL (ref 11.7–15.7)
IMM GRANULOCYTES # BLD: 0 10E3/UL
IMM GRANULOCYTES NFR BLD: 0 %
LYMPHOCYTES # BLD AUTO: 1.4 10E3/UL (ref 0.8–5.3)
LYMPHOCYTES NFR BLD AUTO: 31 %
MCH RBC QN AUTO: 30.6 PG (ref 26.5–33)
MCHC RBC AUTO-ENTMCNC: 32.3 G/DL (ref 31.5–36.5)
MCV RBC AUTO: 95 FL (ref 78–100)
MONOCYTES # BLD AUTO: 0.5 10E3/UL (ref 0–1.3)
MONOCYTES NFR BLD AUTO: 11 %
NEUTROPHILS # BLD AUTO: 2.3 10E3/UL (ref 1.6–8.3)
NEUTROPHILS NFR BLD AUTO: 52 %
NRBC # BLD AUTO: 0 10E3/UL
NRBC BLD AUTO-RTO: 0 /100
PLATELET # BLD AUTO: 219 10E3/UL (ref 150–450)
POTASSIUM BLD-SCNC: 4.4 MMOL/L (ref 3.5–5)
PROT SERPL-MCNC: 6 G/DL (ref 6–8)
RBC # BLD AUTO: 3.96 10E6/UL (ref 3.8–5.2)
SODIUM SERPL-SCNC: 144 MMOL/L (ref 136–145)
WBC # BLD AUTO: 4.4 10E3/UL (ref 4–11)

## 2022-04-29 PROCEDURE — G0378 HOSPITAL OBSERVATION PER HR: HCPCS

## 2022-04-29 PROCEDURE — 85025 COMPLETE CBC W/AUTO DIFF WBC: CPT | Performed by: HOSPITALIST

## 2022-04-29 PROCEDURE — 99217 PR OBSERVATION CARE DISCHARGE: CPT | Performed by: HOSPITALIST

## 2022-04-29 PROCEDURE — 80053 COMPREHEN METABOLIC PANEL: CPT | Performed by: HOSPITALIST

## 2022-04-29 PROCEDURE — 250N000013 HC RX MED GY IP 250 OP 250 PS 637: Performed by: HOSPITALIST

## 2022-04-29 PROCEDURE — 97161 PT EVAL LOW COMPLEX 20 MIN: CPT | Mod: GP

## 2022-04-29 PROCEDURE — 36415 COLL VENOUS BLD VENIPUNCTURE: CPT | Performed by: HOSPITALIST

## 2022-04-29 RX ORDER — MECLIZINE HYDROCHLORIDE 25 MG/1
25 TABLET ORAL 3 TIMES DAILY PRN
Qty: 30 TABLET | Refills: 0 | Status: SHIPPED | OUTPATIENT
Start: 2022-04-29

## 2022-04-29 RX ORDER — FUROSEMIDE 20 MG
20 TABLET ORAL DAILY
Status: DISCONTINUED | OUTPATIENT
Start: 2022-04-29 | End: 2022-04-29 | Stop reason: HOSPADM

## 2022-04-29 RX ORDER — FUROSEMIDE 40 MG
40 TABLET ORAL EVERY MORNING
Status: DISCONTINUED | OUTPATIENT
Start: 2022-04-29 | End: 2022-04-29 | Stop reason: HOSPADM

## 2022-04-29 RX ADMIN — SPIRONOLACTONE 25 MG: 25 TABLET, FILM COATED ORAL at 09:09

## 2022-04-29 RX ADMIN — HYDRALAZINE HYDROCHLORIDE 10 MG: 10 TABLET, FILM COATED ORAL at 08:35

## 2022-04-29 RX ADMIN — LOSARTAN POTASSIUM 100 MG: 50 TABLET, FILM COATED ORAL at 09:10

## 2022-04-29 RX ADMIN — FUROSEMIDE 40 MG: 40 TABLET ORAL at 09:09

## 2022-04-29 NOTE — PLAN OF CARE
Problem: Plan of Care - These are the overarching goals to be used throughout the patient stay.    Goal: Plan of Care Review/Shift Note  Description: The Plan of Care Review/Shift note should be completed every shift.  The Outcome Evaluation is a brief statement about your assessment that the patient is improving, declining, or no change.  This information will be displayed automatically on your shift note.  Outcome: Ongoing, Progressing     Problem: Plan of Care - These are the overarching goals to be used throughout the patient stay.    Goal: Readiness for Transition of Care  Outcome: Ongoing, Progressing   Goal Outcome Evaluation:    VSS. BP's high vs low. Denies pain. Up to bathroom overnight. Slept well. Eager to discharge today.

## 2022-04-29 NOTE — DISCHARGE SUMMARY
[facility] MEDICINE  DISCHARGE SUMMARY     Primary Care Physician: Celeste Rodriguez  Admission Date: 4/27/2022   Discharge Provider: Princess Prado MD Discharge Date: 4/29/2022   Diet:   Active Diet and Nourishment Order   Procedures     Diet     Regular Diet Adult       Code Status: Full Code   Activity: DCACTIVITY: Activity as tolerated        Condition at Discharge: Stable     REASON FOR PRESENTATION(See Admission Note for Details)     Dizziness    PRINCIPAL & ACTIVE DISCHARGE DIAGNOSES     Active Problems:    Hyperkalemia    Vertigo      PENDING LABS     Unresulted Labs Ordered in the Past 30 Days of this Admission     No orders found from 3/28/2022 to 4/28/2022.            PROCEDURES ( this hospitalization only)          RECOMMENDATIONS TO OUTPATIENT PROVIDER FOR F/U VISIT     Follow-up Appointments     Follow-up and recommended labs and tests       Follow up with primary care provider, Celeste Rodriguez, within 7 days for   hospital follow- up.  No follow up labs or test are needed.             {Additional follow-up instructions/to-do's for PCP    : BP monitoring     DISPOSITION     Home    SUMMARY OF HOSPITAL COURSE:      Meena Kyle is a 82 year old female admitted on 4/27/2022. She has h/o hypertension, mood disorder, hyperlipidemia, restless leg presented with diziness     Dizziness - unclear etiology  -Onset 1 week.  - MRI of the brain showed loss of normal flow in the left transverse and sigmoid sinus may be related to slow flow on the venous sinus thrombosis could give this appearance.  -MRV showed no dural venous sinus thrombosis or significant stenosis.  -Orthostatic BP negative  -Echocardiogram unremarkable.  -Started on meclizine, continue this as needed on discharge as patient reports this seems to be helping slightly.   -PT/OT recommend pt to be discharge home.        Hypertension-  -Continue PTA hydralazine, losartan, spironolactone, Lasix on  discharge. .     Hyperlipidemia-continue PTA statin     Restless leg syndrome-PTA Requip     Mood disorder-stable, resume PTA sertraline     Hyperkalemia - resolved with no intervention     DANE on CKD stage 3?  -Resolved with IVF. Cr WNL today.    .Patient stable to be discharged home today. Please refer to discharge medications and instructions for more details.Discussed plan with patient, family by bedside on the day of discharge.      Discharge Medications with Med changes:     Current Discharge Medication List      START taking these medications    Details   meclizine (ANTIVERT) 25 MG tablet Take 1 tablet (25 mg) by mouth 3 times daily as needed for dizziness  Qty: 30 tablet, Refills: 0    Associated Diagnoses: Vertigo         CONTINUE these medications which have NOT CHANGED    Details   acyclovir (ZOVIRAX) 400 MG tablet Take 400 mg by mouth 3 times daily as needed (Herpetic humberto) For 10 days.      !! furosemide (LASIX) 40 MG tablet Take 40 mg by mouth every morning      !! furosemide (LASIX) 40 MG tablet Take 20 mg by mouth daily Afternoon, half-tablet      hydrALAZINE (APRESOLINE) 10 MG tablet Take 10 mg by mouth 3 times daily      losartan (COZAAR) 100 MG tablet Take 100 mg by mouth daily      !! rOPINIRole (REQUIP) 0.5 MG tablet Take 0.5 mg by mouth At Bedtime May repeat x1.      !! rOPINIRole (REQUIP) 0.5 MG tablet Take 0.5 mg by mouth nightly as needed      rosuvastatin (CRESTOR) 10 MG tablet Take 10 mg by mouth At Bedtime      sertraline (ZOLOFT) 100 MG tablet Take 100 mg by mouth daily      spironolactone (ALDACTONE) 25 MG tablet Take 25 mg by mouth every morning       !! - Potential duplicate medications found. Please discuss with provider.                Rationale for medication changes:      See med rec        Consults       PHYSICAL THERAPY ADULT IP CONSULT  OCCUPATIONAL THERAPY ADULT IP CONSULT    Immunizations given this encounter       There is no immunization history on file for this patient.         Anticoagulation Information      Recent INR results: No results for input(s): INR in the last 168 hours.  Warfarin doses (if applicable) or name of other anticoagulant: N/A      SIGNIFICANT IMAGING FINDINGS     Results for orders placed or performed during the hospital encounter of 04/27/22   MR Brain w/o Contrast    Impression    IMPRESSION:  1.  Loss of the normal flow void in the left transverse and sigmoid sinuses may be related to slow flow although venous sinus thrombosis could give this appearance. If indicated, consider MRV or CTV of the head.  2.  Age-related changes.   MRV Brain wo & w Contrast    Impression    IMPRESSION:  HEAD MRV:   1.  No dural venous sinus thrombosis or significant stenosis.   Echocardiogram Complete   Result Value Ref Range    LVEF  55-60%        SIGNIFICANT LABORATORY FINDINGS     MR Brain w/o Contrast  Result Date: 4/28/2022      IMPRESSION: 1.  Loss of the normal flow void in the left transverse and sigmoid sinuses may be related to slow flow although venous sinus thrombosis could give this appearance. If indicated, consider MRV or CTV of the head. 2.  Age-related changes.    Echocardiogram Complete  Result Date: 4/28/2022   Interpretation Summary  There is borderline concentric left ventricular hypertrophy. The visual ejection fraction is 55-60%. No regional wall motion abnormalities noted. There is mild (1+) mitral regurgitation. There is moderate (2+) aortic regurgitation. There is mild to moderate (1-2+) tricuspid regurgitation. The right ventricular systolic pressure is approximated at 44mmHg plus the right atrial pressure. ______________________________________________________________________________ Left Ventricle The left ventricle is normal in size. There is borderline concentric left ventricular hypertrophy. The visual ejection fraction is 55-60%. Diastolic Doppler findings (E/E' ratio and/or other parameters) suggest left ventricular filling pressures are  indeterminate. No regional wall motion abnormalities noted.  Right Ventricle The right ventricle is normal in size and function.  Atria The left atrium is borderline dilated. Right atrial size is normal. There is no color Doppler evidence of an atrial shunt.  Mitral Valve Mitral valve leaflets appear normal. There is mild (1+) mitral regurgitation.  Tricuspid Valve Tricuspid valve leaflets appear normal. There is mild to moderate (1-2+) tricuspid regurgitation. The right ventricular systolic pressure is approximated at 44mmHg plus the right atrial pressure.  Aortic Valve There is mild trileaflet aortic sclerosis. There is moderate (2+) aortic regurgitation.  Pulmonic Valve The pulmonic valve is normal in structure and function.  Vessels The aorta root is normal. Normal size ascending aorta. IVC diameter <2.1 cm collapsing >50% with sniff suggests a normal RA pressure of 3 mmHg.  Pericardium There is no pericardial effusion.  ______________________________________________________________________________ MMode/2D Measurements & Calculations IVSd: 0.91 cm LVIDd: 4.2 cm LVIDs: 2.8 cm LVPWd: 0.93 cm FS: 34.2 %  LV mass(C)d: 123.9 grams LV mass(C)dI: 76.6 grams/m2 Ao root diam: 2.7 cm LA dimension: 3.0 cm asc Aorta Diam: 3.4 cm LA/Ao: 1.1 LVOT diam: 1.9 cm LVOT area: 2.8 cm2 RWT: 0.44  Doppler Measurements & Calculations MV E max fletcher: 99.0 cm/sec MV A max fletcher: 86.3 cm/sec MV E/A: 1.1 MV max P.8 mmHg MV mean P.3 mmHg MV V2 VTI: 25.4 cm MVA(VTI): 2.5 cm2 MV dec slope: 685.4 cm/sec2 MV dec time: 0.14 sec Ao V2 max: 185.5 cm/sec Ao max P.0 mmHg Ao V2 mean: 133.2 cm/sec Ao mean P.8 mmHg Ao V2 VTI: 39.9 cm DON(I,D): 1.6 cm2 DON(V,D): 1.7 cm2 AI P1/2t: 377.2 msec LV V1 max P.2 mmHg LV V1 max: 114.5 cm/sec LV V1 VTI: 23.4 cm SV(LVOT): 64.6 ml SI(LVOT): 39.9 ml/m2 PA acc time: 0.08 sec TR max fletcher: 331.8 cm/sec TR max P.0 mmHg  AV Fletcher Ratio (DI): 0.62 DON Index (cm2/m2): 1.0 E/E': 9.8 E/E' avg: 10.7  Lateral E/e': 11.6 Medial E/e': 9.8 Peak E' Fletcher: 10.1 cm/sec  ______________________________________________________________________________ Report approved by: Shailesh Harrell 04/28/2022 02:43 PM       MRV Brain wo & w Contrast  Result Date: 4/28/2022      IMPRESSION: HEAD MRV: 1.  No dural venous sinus thrombosis or significant stenosis.      Discharge Orders        Physical Therapy Referral      Reason for your hospital stay    Dizziness     Follow-up and recommended labs and tests     Follow up with primary care provider, Celeste Rodriguez, within 7 days for hospital follow- up.  No follow up labs or test are needed.     Activity    Your activity upon discharge: activity as tolerated     Diet    Follow this diet upon discharge: Orders Placed This Encounter    Low sodium diet       Examination   Physical Exam   Temp:  [97.7  F (36.5  C)-98.2  F (36.8  C)] 98  F (36.7  C)  Pulse:  [61-82] 70  Resp:  [16-18] 16  BP: (126-175)/(58-86) 130/58  SpO2:  [96 %-99 %] 96 %  Wt Readings from Last 1 Encounters:   04/27/22 61.2 kg (135 lb)       General: Pleasant, NAD  HEENT:EOMI, AT,NC  CVS:RRR, no edema  RS:CTAB  Abd: Soft, NT,ND  Neurology:Grossly normal  Psy:Approrpiate affect        Please see EMR for more detailed significant labs, imaging, consultant notes etc.    IPrincess MD, personally saw the patient today and spent greater than 30 minutes discharging this patient.    Princess Prado MD  [facility]    CC:Celeste Rodriguez

## 2022-04-29 NOTE — PROGRESS NOTES
04/29/22 1005   Quick Adds   Quick Adds Vestibular Eval   Type of Visit Initial PT Evaluation   Living Environment   People in Home alone   Current Living Arrangements house   Home Accessibility stairs to enter home;stairs within home   Number of Stairs, Main Entrance 1   Stair Railings, Main Entrance none   Number of Stairs, Within Home, Primary greater than 10 stairs   Stair Railings, Within Home, Primary railings safe and in good condition   Transportation Anticipated family or friend will provide   Self-Care   Equipment Currently Used at Home none   Activity/Exercise/Self-Care Comment independent ADL's/IADL's   General Information   Onset of Illness/Injury or Date of Surgery 04/27/22   Referring Physician Dr. Prado   Patient/Family Therapy Goals Statement (PT) go home   Pertinent History of Current Problem (include personal factors and/or comorbidities that impact the POC) hyperkalemia, vertigo; PMH of HTN, HLD, mood disorder, restless leg   Existing Precautions/Restrictions no known precautions/restrictions   Cognition   Affect/Mental Status (Cognition) WFL   Orientation Status (Cognition) oriented x 4   Follows Commands (Cognition) WFL   Range of Motion (ROM)   Range of Motion ROM is WFL   Strength (Manual Muscle Testing)   Strength (Manual Muscle Testing) strength is WFL   Bed Mobility   Bed Mobility no deficits identified   Transfers   Transfers no deficits identified   Gait/Stairs (Locomotion)   [place] Level (Gait) independent   Assistive Device (Gait) other (see comments)  (none)   Distance in Feet (Required for LE Total Joints) 150   Pattern (Gait) step-through   Deviations/Abnormal Patterns (Gait) other (see comments)  (mild path deviation with head up/down and head turns; no LOB)   Balance   Balance Comments Instructed pt in standing balance ex for home.   Infrared Goggle Exam or Frenzel Lense Exam   Exam completed with Room Light   Spontaneous Nystagmus Negative   Jodi-Hallpike (Right)  Negative   Jodi-Hallpike (Left) Negative   HSCC Supine Roll Test (Right) Negative   HSCC Supine Roll Test (Left) Negative   Clinical Impression   Criteria for Skilled Therapeutic Intervention Evaluation only   Clinical Presentation (PT Evaluation Complexity) Stable/Uncomplicated   Clinical Presentation Rationale Pt presents as medically diagnosed   Clinical Decision Making (Complexity) low complexity   Anticipated Equipment Needs at Discharge (PT) other (see comments)  (none)   Risk & Benefits of therapy have been explained evaluation/treatment results reviewed;patient  (niece also present during eval)   PT Discharge Planning   PT Discharge Recommendation (DC Rec) home   PT Rationale for DC Rec Negative for BPPV. Pt is safe with ambulation without assistive device.   Total Evaluation Time   Total Evaluation Time (Minutes) 20

## 2022-04-29 NOTE — PROGRESS NOTES
Pt in cardiac testing, discussed [place] with manoj. Pt is discharging and was unavailable.     KIAN Suarez  4/29/2022  10:29 AM

## 2023-02-15 ENCOUNTER — HOSPITAL ENCOUNTER (OUTPATIENT)
Dept: RADIOLOGY | Facility: HOSPITAL | Age: 83
Discharge: HOME | End: 2023-02-15
Attending: PHYSICIAN ASSISTANT | Admitting: PHYSICIAN ASSISTANT
Payer: COMMERCIAL

## 2023-02-15 DIAGNOSIS — R19.8 IRREGULAR BOWEL HABITS: ICD-10-CM

## 2023-02-15 PROCEDURE — 74018 RADEX ABDOMEN 1 VIEW: CPT
